# Patient Record
Sex: MALE | URBAN - METROPOLITAN AREA
[De-identification: names, ages, dates, MRNs, and addresses within clinical notes are randomized per-mention and may not be internally consistent; named-entity substitution may affect disease eponyms.]

---
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
## 2025-06-06 NOTE — PROCEDURE: ADDITIONAL NOTES
patient
Additional Notes: Discussed that acne on back and chest is difficult to treat topically. Pt declines oral medications
Detail Level: Simple